# Patient Record
Sex: MALE | Race: WHITE | Employment: UNEMPLOYED | URBAN - METROPOLITAN AREA
[De-identification: names, ages, dates, MRNs, and addresses within clinical notes are randomized per-mention and may not be internally consistent; named-entity substitution may affect disease eponyms.]

---

## 2022-03-20 PROBLEM — J30.1 SEASONAL ALLERGIC RHINITIS DUE TO POLLEN: Status: ACTIVE | Noted: 2018-04-27

## 2022-10-12 ENCOUNTER — OFFICE VISIT (OUTPATIENT)
Dept: ORTHOPEDIC SURGERY | Age: 32
End: 2022-10-12
Payer: COMMERCIAL

## 2022-10-12 VITALS — BODY MASS INDEX: 23.1 KG/M2 | WEIGHT: 180 LBS | HEIGHT: 74 IN

## 2022-10-12 DIAGNOSIS — G89.29 CHRONIC PAIN OF LEFT KNEE: Primary | ICD-10-CM

## 2022-10-12 DIAGNOSIS — M25.562 CHRONIC PAIN OF LEFT KNEE: Primary | ICD-10-CM

## 2022-10-12 PROCEDURE — 99203 OFFICE O/P NEW LOW 30 MIN: CPT | Performed by: SPECIALIST/TECHNOLOGIST

## 2022-10-12 RX ORDER — FLUTICASONE PROPIONATE 50 MCG
SPRAY, SUSPENSION (ML) NASAL
COMMUNITY
Start: 2022-10-11

## 2022-10-12 RX ORDER — AMOXICILLIN AND CLAVULANATE POTASSIUM 875; 125 MG/1; MG/1
TABLET, FILM COATED ORAL
COMMUNITY
Start: 2022-10-11

## 2022-10-12 RX ORDER — DICLOFENAC SODIUM 75 MG/1
75 TABLET, DELAYED RELEASE ORAL 2 TIMES DAILY
Qty: 60 TABLET | Refills: 0 | Status: SHIPPED | OUTPATIENT
Start: 2022-10-12 | End: 2022-11-11

## 2022-10-12 NOTE — PROGRESS NOTES
Name: Romeo Guevara  YOB: 1990  Gender: male  MRN: [id number]      CC: New Patient (L knee pain)       HPI: Romeo Guevara is a 33 y.o. male who presents with New Patient (L knee pain)  Mr. Obed Perdomo is a new patient who presents with left knee pain. He reports having torn his meniscus back in 2019, but denies having any surgical intervention at the times. He notes that with time and formal physical therapy his pain subsided. However, he reports that about a month ago his pain returned after playing a tennis match. He recalls making a pivoting movement and feeling a shift in his knee. Since then he reports that he has been experiencing similar symptoms from when he first tore his meniscus; however, not at severe. He notes mild pain with flexion but states that his pain has not hindered him from competing in tennis. ROS/Meds/PSH/PMH/FH/SH: I personally reviewed the patients standard intake form. Below are the pertinents    Tobacco:  reports that he has never smoked. He has never used smokeless tobacco.  Diabetes: none  Other: Allergic rhinitis    Physical Examination:  General: no acute distress  Lungs: breathing easily  CV: regular rhythm by pulse  Left Knee: Minimal effusion present. Mild tenderness to palpation the medial joint line. Full active and passive range of motion with pain in the extreme of flexion. Stable to varus and valgus stress at 0 degrees and varus at 30 degrees. Mild laxity with valgus stress at 30 degrees and pain elicited. Mild pain with Arielle's localized to the medial joint line. Negative bounce home test.      Imaging:   Knee XR: 3 views     Clinical Indication  1.  Chronic pain of left knee           Report: AP, lateral, sunrise views of the Left knee demonstrates no acute fracture dislocation, or advanced degenerative changes    Impression: No acute findings as above           All imaging interpreted by myself DARRELL Linares independent of radiology review        Assessment:   1. Chronic pain of left knee         Plan: Think the majority of the patient's left knee pain may be due to a mild MCL sprain however cannot exclusively rule out a meniscal pathology. I discussed with the patient conservative treatment options to include corticosteroid injection, formal physical therapy and advanced imaging. I think due to the fact that he had a new mechanism of injury which is acute and was rotational is more likely that he has a meniscus tear. I will evaluate this further with an MRI and he will return after MRI for definitive treatment. We will also provide him 75 mg diclofenac at this time for pain and I explained to him not to take any other NSAID products and to take this medication with food.         Khari Arevalo, MS, APRN, FNP-BC  [facility]

## 2022-10-28 ENCOUNTER — OFFICE VISIT (OUTPATIENT)
Dept: ORTHOPEDIC SURGERY | Age: 32
End: 2022-10-28
Payer: COMMERCIAL

## 2022-10-28 DIAGNOSIS — G89.29 CHRONIC PAIN OF LEFT KNEE: Primary | ICD-10-CM

## 2022-10-28 DIAGNOSIS — S83.242D TEAR OF MEDIAL MENISCUS OF LEFT KNEE, CURRENT, UNSPECIFIED TEAR TYPE, SUBSEQUENT ENCOUNTER: ICD-10-CM

## 2022-10-28 DIAGNOSIS — M25.562 CHRONIC PAIN OF LEFT KNEE: Primary | ICD-10-CM

## 2022-10-28 PROCEDURE — 20610 DRAIN/INJ JOINT/BURSA W/O US: CPT | Performed by: SPECIALIST/TECHNOLOGIST

## 2022-10-28 RX ORDER — TRIAMCINOLONE ACETONIDE 40 MG/ML
40 INJECTION, SUSPENSION INTRA-ARTICULAR; INTRAMUSCULAR ONCE
Status: COMPLETED | OUTPATIENT
Start: 2022-10-28 | End: 2022-10-28

## 2022-10-28 RX ADMIN — TRIAMCINOLONE ACETONIDE 40 MG: 40 INJECTION, SUSPENSION INTRA-ARTICULAR; INTRAMUSCULAR at 11:40

## 2022-10-28 NOTE — PROGRESS NOTES
Name: Klarissa Cisneros  YOB: 1990  Gender: male  MRN: [id number]      CC: Follow-up (L knee MRI f/u)       HPI: Klarissa Cisneros is a 33 y.o. male who returns for follow up with MRI results on left knee pain. Reports some improvement of his left knee pain and notes that he only really has pain with deep squatting. He reports that he has a tennis match that he wants to play next week and begin training for a half marathon. Physical Examination:  General: no acute distress  Lungs: breathing easily  CV: regular rhythm by pulse  Left Knee: Negative effusion present. Mild tenderness to palpation medial joint line. Full active and passive range of motion with pain in the extreme of flexion. Negative ligamentous exam x4. Mild pain with Arielle's. Negative bounce home test.    Imaging: Reviewed an MRI performed in our system on 10/20/2022 which shows obliquely orientated undersurface tear of the medial meniscus body segment and posterior horn. No injury to the cruciate or collateral ligaments. Assessment:   1. Chronic pain of left knee    2. Tear of medial meniscus of left knee, current, unspecified tear type, subsequent encounter         Plan:   Discussed with the patient and reviewed the MRI which does show a medial meniscus tear which correlates with clinical exam.  I discussed with the patient conservative treatment options to include corticosteroid injection versus surgical intervention. He does not wish to proceed with surgical intervention at this time and would like to proceed with conservative treatment. I think you will get excellent benefit from a corticosteroid injection which she agreed to proceed with today. I did discuss with the patient that if he does not get significant provement with the steroid injection or he has another episode of feeling unstable and I would recommend he proceed with surgical consultation with Dr. Martin Vincent.   He can call the office if he wishes to proceed with referral to the surgeon.     Hitesh Sharma APRN - CNP    [facility]